# Patient Record
Sex: MALE | Race: WHITE | Employment: OTHER | URBAN - METROPOLITAN AREA
[De-identification: names, ages, dates, MRNs, and addresses within clinical notes are randomized per-mention and may not be internally consistent; named-entity substitution may affect disease eponyms.]

---

## 2017-03-03 PROBLEM — E66.01 SEVERE OBESITY (BMI 35.0-39.9) WITH COMORBIDITY (HCC): Status: ACTIVE | Noted: 2017-03-03

## 2017-03-03 PROBLEM — I77.9 BILATERAL CAROTID ARTERY DISEASE (HCC): Status: ACTIVE | Noted: 2017-03-03

## 2017-03-03 PROBLEM — I70.0 AORTIC ATHEROSCLEROSIS (HCC): Status: ACTIVE | Noted: 2017-03-03

## 2017-03-06 PROCEDURE — 86803 HEPATITIS C AB TEST: CPT | Performed by: INTERNAL MEDICINE

## 2017-03-07 PROBLEM — R73.01 IFG (IMPAIRED FASTING GLUCOSE): Status: ACTIVE | Noted: 2017-03-07

## 2017-04-04 PROBLEM — G47.33 OSA (OBSTRUCTIVE SLEEP APNEA): Status: ACTIVE | Noted: 2017-04-04

## 2018-03-26 PROBLEM — R91.1 INCIDENTAL PULMONARY NODULE, > 3MM AND < 8MM: Status: ACTIVE | Noted: 2018-03-26

## 2018-05-01 PROBLEM — I25.10 CORONARY ARTERY CALCIFICATION: Status: ACTIVE | Noted: 2018-05-01

## 2018-05-01 PROBLEM — R07.2 PRECORDIAL CHEST PAIN: Status: ACTIVE | Noted: 2018-05-01

## 2018-05-01 PROBLEM — I25.84 CORONARY ARTERY CALCIFICATION: Status: ACTIVE | Noted: 2018-05-01

## 2018-06-06 PROBLEM — R94.39 ABNORMAL NUCLEAR STRESS TEST: Status: ACTIVE | Noted: 2018-06-06

## 2018-06-06 PROBLEM — I25.10 CORONARY ARTERY DISEASE INVOLVING NATIVE CORONARY ARTERY OF NATIVE HEART WITHOUT ANGINA PECTORIS: Status: ACTIVE | Noted: 2018-06-06

## 2018-06-13 ENCOUNTER — HOSPITAL ENCOUNTER (OUTPATIENT)
Dept: INTERVENTIONAL RADIOLOGY/VASCULAR | Facility: HOSPITAL | Age: 73
Discharge: HOME | End: 2018-06-14
Attending: INTERNAL MEDICINE | Admitting: INTERNAL MEDICINE
Payer: MEDICARE

## 2018-06-13 DIAGNOSIS — R93.1 ABNORMAL NUCLEAR CARDIAC IMAGING TEST: ICD-10-CM

## 2018-06-13 DIAGNOSIS — I25.10 CAD (CORONARY ARTERY DISEASE): ICD-10-CM

## 2018-06-13 DIAGNOSIS — R07.9 CHEST PAIN, UNSPECIFIED TYPE: ICD-10-CM

## 2018-06-13 PROCEDURE — 85027 COMPLETE CBC AUTOMATED: CPT | Performed by: INTERNAL MEDICINE

## 2018-06-13 PROCEDURE — 027034Z DILATION OF CORONARY ARTERY, ONE ARTERY WITH DRUG-ELUTING INTRALUMINAL DEVICE, PERCUTANEOUS APPROACH: ICD-10-PCS | Performed by: INTERNAL MEDICINE

## 2018-06-13 PROCEDURE — 80076 HEPATIC FUNCTION PANEL: CPT

## 2018-06-13 PROCEDURE — 99152 MOD SED SAME PHYS/QHP 5/>YRS: CPT

## 2018-06-13 PROCEDURE — 93454 CORONARY ARTERY ANGIO S&I: CPT

## 2018-06-13 PROCEDURE — 93458 L HRT ARTERY/VENTRICLE ANGIO: CPT

## 2018-06-13 PROCEDURE — 4A023N7 MEASUREMENT OF CARDIAC SAMPLING AND PRESSURE, LEFT HEART, PERCUTANEOUS APPROACH: ICD-10-PCS | Performed by: INTERNAL MEDICINE

## 2018-06-13 PROCEDURE — 94660 CPAP INITIATION&MGMT: CPT

## 2018-06-13 PROCEDURE — 99153 MOD SED SAME PHYS/QHP EA: CPT

## 2018-06-13 PROCEDURE — 36415 COLL VENOUS BLD VENIPUNCTURE: CPT

## 2018-06-13 PROCEDURE — 85347 COAGULATION TIME ACTIVATED: CPT

## 2018-06-13 PROCEDURE — 80048 BASIC METABOLIC PNL TOTAL CA: CPT | Performed by: INTERNAL MEDICINE

## 2018-06-13 PROCEDURE — B2111ZZ FLUOROSCOPY OF MULTIPLE CORONARY ARTERIES USING LOW OSMOLAR CONTRAST: ICD-10-PCS | Performed by: INTERNAL MEDICINE

## 2018-06-13 PROCEDURE — 80061 LIPID PANEL: CPT

## 2018-06-13 RX ORDER — MIDAZOLAM HYDROCHLORIDE 1 MG/ML
INJECTION INTRAMUSCULAR; INTRAVENOUS
Status: COMPLETED
Start: 2018-06-13 | End: 2018-06-13

## 2018-06-13 RX ORDER — SODIUM CHLORIDE 9 MG/ML
INJECTION, SOLUTION INTRAVENOUS CONTINUOUS
Status: DISCONTINUED | OUTPATIENT
Start: 2018-06-13 | End: 2018-06-14

## 2018-06-13 RX ORDER — LIDOCAINE HYDROCHLORIDE 10 MG/ML
INJECTION, SOLUTION EPIDURAL; INFILTRATION; INTRACAUDAL; PERINEURAL
Status: DISCONTINUED
Start: 2018-06-13 | End: 2018-06-13 | Stop reason: WASHOUT

## 2018-06-13 RX ORDER — HEPARIN SODIUM 10000 [USP'U]/100ML
7 INJECTION, SOLUTION INTRAVENOUS CONTINUOUS
Status: ACTIVE | OUTPATIENT
Start: 2018-06-13 | End: 2018-06-13

## 2018-06-13 RX ORDER — ENALAPRIL MALEATE 10 MG/1
20 TABLET ORAL DAILY
Status: DISCONTINUED | OUTPATIENT
Start: 2018-06-13 | End: 2018-06-14

## 2018-06-13 RX ORDER — PRASUGREL 10 MG/1
TABLET, FILM COATED ORAL
Status: COMPLETED
Start: 2018-06-13 | End: 2018-06-13

## 2018-06-13 RX ORDER — VERAPAMIL HYDROCHLORIDE 2.5 MG/ML
INJECTION, SOLUTION INTRAVENOUS
Status: COMPLETED
Start: 2018-06-13 | End: 2018-06-13

## 2018-06-13 RX ORDER — ASPIRIN 81 MG/1
324 TABLET, CHEWABLE ORAL ONCE
Status: DISCONTINUED | OUTPATIENT
Start: 2018-06-13 | End: 2018-06-14

## 2018-06-13 RX ORDER — AMLODIPINE BESYLATE 5 MG/1
5 TABLET ORAL DAILY
Status: DISCONTINUED | OUTPATIENT
Start: 2018-06-13 | End: 2018-06-14

## 2018-06-13 RX ORDER — HEPARIN SODIUM 10000 [USP'U]/100ML
INJECTION, SOLUTION INTRAVENOUS
Status: COMPLETED
Start: 2018-06-13 | End: 2018-06-13

## 2018-06-13 RX ORDER — HEPARIN SODIUM 5000 [USP'U]/ML
INJECTION, SOLUTION INTRAVENOUS; SUBCUTANEOUS
Status: COMPLETED
Start: 2018-06-13 | End: 2018-06-13

## 2018-06-13 RX ORDER — ASPIRIN 81 MG/1
81 TABLET ORAL DAILY
Status: DISCONTINUED | OUTPATIENT
Start: 2018-06-14 | End: 2018-06-14

## 2018-06-13 RX ORDER — ATORVASTATIN CALCIUM 40 MG/1
40 TABLET, FILM COATED ORAL NIGHTLY
Status: DISCONTINUED | OUTPATIENT
Start: 2018-06-13 | End: 2018-06-14

## 2018-06-13 RX ORDER — HYDROCHLOROTHIAZIDE 25 MG/1
25 TABLET ORAL DAILY
Status: DISCONTINUED | OUTPATIENT
Start: 2018-06-13 | End: 2018-06-14

## 2018-06-13 RX ORDER — PRASUGREL 10 MG/1
10 TABLET, FILM COATED ORAL DAILY
Status: DISCONTINUED | OUTPATIENT
Start: 2018-06-14 | End: 2018-06-14

## 2018-06-13 RX ORDER — SODIUM CHLORIDE 9 MG/ML
INJECTION, SOLUTION INTRAVENOUS CONTINUOUS
Status: ACTIVE | OUTPATIENT
Start: 2018-06-13 | End: 2018-06-14

## 2018-06-13 RX ADMIN — ATORVASTATIN CALCIUM 40 MG: 40 TABLET, FILM COATED ORAL at 21:39:00

## 2018-06-13 RX ADMIN — SODIUM CHLORIDE: 9 INJECTION, SOLUTION INTRAVENOUS at 20:15:00

## 2018-06-13 RX ADMIN — SODIUM CHLORIDE: 9 INJECTION, SOLUTION INTRAVENOUS at 14:30:00

## 2018-06-13 RX ADMIN — AMLODIPINE BESYLATE 5 MG: 5 TABLET ORAL at 21:39:00

## 2018-06-13 RX ADMIN — HEPARIN SODIUM 11.3 UNITS/KG/HR: 10000 INJECTION, SOLUTION INTRAVENOUS at 20:15:00

## 2018-06-13 RX ADMIN — ENALAPRIL MALEATE 20 MG: 10 TABLET ORAL at 21:39:00

## 2018-06-13 NOTE — PROGRESS NOTES
Cath Note  Patient with risk factors for CAD, positive coronary calcium score, abnormal nuclear stress test with anterior apical ischemia. Plan cath possible PTCA. Risks benefits and alternatives shared with patient who understands and wishes to proceed.

## 2018-06-14 VITALS
TEMPERATURE: 98 F | BODY MASS INDEX: 30.61 KG/M2 | OXYGEN SATURATION: 100 % | HEART RATE: 51 BPM | RESPIRATION RATE: 17 BRPM | SYSTOLIC BLOOD PRESSURE: 125 MMHG | DIASTOLIC BLOOD PRESSURE: 71 MMHG | WEIGHT: 195 LBS | HEIGHT: 67 IN

## 2018-06-14 PROBLEM — G47.33 OSA ON CPAP: Status: ACTIVE | Noted: 2017-04-04

## 2018-06-14 PROBLEM — Z95.820 S/P ANGIOPLASTY WITH STENT: Status: ACTIVE | Noted: 2018-06-14

## 2018-06-14 PROBLEM — Z99.89 OSA ON CPAP: Status: ACTIVE | Noted: 2017-04-04

## 2018-06-14 PROCEDURE — 80048 BASIC METABOLIC PNL TOTAL CA: CPT | Performed by: INTERNAL MEDICINE

## 2018-06-14 PROCEDURE — 85027 COMPLETE CBC AUTOMATED: CPT | Performed by: INTERNAL MEDICINE

## 2018-06-14 PROCEDURE — 99211 OFF/OP EST MAY X REQ PHY/QHP: CPT

## 2018-06-14 RX ORDER — PRASUGREL 10 MG/1
10 TABLET, FILM COATED ORAL DAILY
Qty: 90 TABLET | Refills: 2 | Status: SHIPPED | OUTPATIENT
Start: 2018-06-15 | End: 2019-02-11

## 2018-06-14 RX ORDER — ASPIRIN 81 MG/1
81 TABLET ORAL DAILY
Qty: 90 TABLET | Refills: 0 | Status: SHIPPED | OUTPATIENT
Start: 2018-06-15

## 2018-06-14 RX ADMIN — HYDROCHLOROTHIAZIDE 25 MG: 25 TABLET ORAL at 08:28:00

## 2018-06-14 RX ADMIN — PRASUGREL 10 MG: 10 TABLET, FILM COATED ORAL at 08:28:00

## 2018-06-14 RX ADMIN — ENALAPRIL MALEATE 20 MG: 10 TABLET ORAL at 08:28:00

## 2018-06-14 RX ADMIN — ASPIRIN 81 MG: 81 TABLET ORAL at 08:28:00

## 2018-06-14 RX ADMIN — AMLODIPINE BESYLATE 5 MG: 5 TABLET ORAL at 08:28:00

## 2018-06-14 NOTE — PROGRESS NOTES
[facility] Cardiology Progress Note - Jeronimo Hilton Patient Status:  Outpatient in a Bed    1945 MRN [id number]   [facility] 8NE-A Attending Jacob Eason MD   Hosp Day # 0 PCP Sabrina Buckley MD     Subjective: no bruits. Cardiac: Regular rate and rhythm. S1, S2 normal. No murmur, pericardial rub, S3, thrill, heave or extra cardiac sounds. Lungs: Clear without wheezes, rales, rhonchi or dullness. Normal excursions and effort. Abdomen: Soft, non-tender.  No org extremities  Neuro: no sensory or motor complaint  Psyche: no symptoms of depression or anxiety  Hematology: denies bruising or excessive bleeding  Endocrine: no history of diabetes  Allergy: see above drug allergies    Albert Alvarenga MD  6/14/2018  11:24

## 2018-06-14 NOTE — PLAN OF CARE
Patient/Family Goals    • Patient/Family Long Term Goal Progressing    • Patient/Family Short Term Goal Progressing          HX: PRIYA, HTN, CAD,    Patient came in for scheduled cath after abnormal stress, EF 60-65%, RT radial- KARLOS to RCA- TR band- hematoma

## 2018-06-14 NOTE — RESPIRATORY THERAPY NOTE
PRIYA : EQUIPMENT USE: DAILY SUMMARY                                            SET MODE: CPAP WITH CFLEX                                          USAGE IN HOURS: 6:23                                          90% EXP

## 2018-06-14 NOTE — PROGRESS NOTES
Post Procedure Note  Heart catheterization without LV, bilateral sector coronary angiogram.  Prelim left coronary normal.  Right coronary 90% narrowing. PTCA and drug-eluting stent RCA.   Patient was on aspirin received Effient and heparin to an ACT greate

## 2018-06-14 NOTE — PROCEDURES
Fady [address] [facility] Patient Status:  Outpatient in a Bed    1945 MRN [id number]   Location [address] [facility] Attending Hesham Espino MD   Hosp Day # 0 PCP Matthew Andrew MD                                       Car was removed over a J-tipped guidewire. A TR band was positioned on the right wrist and inflated with 13 cc of air. The radial sheath was removed and good hemostasis was obtained.  A good pulse oximeter waveform was recorded from the right hand with the angiography:  -RCA–RCA was a large dominant vessel. There is a 90% mid vessel narrowing. The distal vessel free significant narrowings     Conclusions:   -RCA–90% mid RCA narrowing.       Jeronimo Hayes MD [facility]

## 2018-06-14 NOTE — PROCEDURES
PTCA and stenting of RCA. Using the previously placed 6 [place]ese right radial access sheath, a JR4 Joy coronary guide catheter was used to cannulate the the right coronary artery and roadmapping coronary angiogram was performed.   Patient was on aspirin

## 2018-06-14 NOTE — DIETARY NOTE
Nutrition Short Note    Dietitian consult received per cardiac rehab/CHF protocol. Pt to be educated by cardiac rehab staff and encouraged to attend outpatient classes taught by RD. MAYO available PRN.     Kev Balderas MA, RD, LDN

## 2018-07-18 PROBLEM — E66.01 SEVERE OBESITY (BMI 35.0-39.9) WITH COMORBIDITY (HCC): Status: RESOLVED | Noted: 2017-03-03 | Resolved: 2018-07-18

## 2018-07-19 ENCOUNTER — CARDPULM VISIT (OUTPATIENT)
Dept: CARDIAC REHAB | Facility: HOSPITAL | Age: 73
End: 2018-07-19
Attending: INTERNAL MEDICINE
Payer: MEDICARE

## 2018-07-19 VITALS
HEART RATE: 51 BPM | RESPIRATION RATE: 20 BRPM | DIASTOLIC BLOOD PRESSURE: 80 MMHG | WEIGHT: 190.38 LBS | SYSTOLIC BLOOD PRESSURE: 134 MMHG | OXYGEN SATURATION: 98 % | BODY MASS INDEX: 29.88 KG/M2 | HEIGHT: 67 IN

## 2018-07-19 PROCEDURE — 93798 PHYS/QHP OP CAR RHAB W/ECG: CPT

## 2018-07-23 ENCOUNTER — CARDPULM VISIT (OUTPATIENT)
Dept: CARDIAC REHAB | Facility: HOSPITAL | Age: 73
End: 2018-07-23
Attending: INTERNAL MEDICINE
Payer: MEDICARE

## 2018-07-23 PROCEDURE — 93798 PHYS/QHP OP CAR RHAB W/ECG: CPT

## 2018-07-25 ENCOUNTER — CARDPULM VISIT (OUTPATIENT)
Dept: CARDIAC REHAB | Facility: HOSPITAL | Age: 73
End: 2018-07-25
Attending: INTERNAL MEDICINE
Payer: MEDICARE

## 2018-07-25 PROCEDURE — 93798 PHYS/QHP OP CAR RHAB W/ECG: CPT

## 2018-07-27 ENCOUNTER — CARDPULM VISIT (OUTPATIENT)
Dept: CARDIAC REHAB | Facility: HOSPITAL | Age: 73
End: 2018-07-27
Attending: INTERNAL MEDICINE
Payer: MEDICARE

## 2018-07-27 PROCEDURE — 93798 PHYS/QHP OP CAR RHAB W/ECG: CPT

## 2018-07-30 ENCOUNTER — CARDPULM VISIT (OUTPATIENT)
Dept: CARDIAC REHAB | Facility: HOSPITAL | Age: 73
End: 2018-07-30
Attending: INTERNAL MEDICINE
Payer: MEDICARE

## 2018-07-30 PROCEDURE — 93798 PHYS/QHP OP CAR RHAB W/ECG: CPT

## 2018-08-01 ENCOUNTER — CARDPULM VISIT (OUTPATIENT)
Dept: CARDIAC REHAB | Facility: HOSPITAL | Age: 73
End: 2018-08-01
Attending: INTERNAL MEDICINE
Payer: MEDICARE

## 2018-08-01 PROCEDURE — 93798 PHYS/QHP OP CAR RHAB W/ECG: CPT

## 2018-08-03 ENCOUNTER — CARDPULM VISIT (OUTPATIENT)
Dept: CARDIAC REHAB | Facility: HOSPITAL | Age: 73
End: 2018-08-03
Attending: INTERNAL MEDICINE
Payer: MEDICARE

## 2018-08-03 PROCEDURE — 93798 PHYS/QHP OP CAR RHAB W/ECG: CPT

## 2018-08-06 ENCOUNTER — CARDPULM VISIT (OUTPATIENT)
Dept: CARDIAC REHAB | Facility: HOSPITAL | Age: 73
End: 2018-08-06
Attending: INTERNAL MEDICINE
Payer: MEDICARE

## 2018-08-06 PROCEDURE — 93798 PHYS/QHP OP CAR RHAB W/ECG: CPT

## 2018-08-08 ENCOUNTER — CARDPULM VISIT (OUTPATIENT)
Dept: CARDIAC REHAB | Facility: HOSPITAL | Age: 73
End: 2018-08-08
Attending: INTERNAL MEDICINE
Payer: MEDICARE

## 2018-08-08 PROCEDURE — 93798 PHYS/QHP OP CAR RHAB W/ECG: CPT

## 2018-08-10 ENCOUNTER — CARDPULM VISIT (OUTPATIENT)
Dept: CARDIAC REHAB | Facility: HOSPITAL | Age: 73
End: 2018-08-10
Attending: INTERNAL MEDICINE
Payer: MEDICARE

## 2018-08-10 PROCEDURE — 93798 PHYS/QHP OP CAR RHAB W/ECG: CPT

## 2018-08-13 ENCOUNTER — CARDPULM VISIT (OUTPATIENT)
Dept: CARDIAC REHAB | Facility: HOSPITAL | Age: 73
End: 2018-08-13
Attending: INTERNAL MEDICINE
Payer: MEDICARE

## 2018-08-13 PROCEDURE — 93798 PHYS/QHP OP CAR RHAB W/ECG: CPT

## 2018-08-15 ENCOUNTER — CARDPULM VISIT (OUTPATIENT)
Dept: CARDIAC REHAB | Facility: HOSPITAL | Age: 73
End: 2018-08-15
Attending: INTERNAL MEDICINE
Payer: MEDICARE

## 2018-08-15 PROCEDURE — 93798 PHYS/QHP OP CAR RHAB W/ECG: CPT

## 2018-08-17 ENCOUNTER — CARDPULM VISIT (OUTPATIENT)
Dept: CARDIAC REHAB | Facility: HOSPITAL | Age: 73
End: 2018-08-17
Attending: INTERNAL MEDICINE
Payer: MEDICARE

## 2018-08-17 PROCEDURE — 93798 PHYS/QHP OP CAR RHAB W/ECG: CPT

## 2018-08-20 ENCOUNTER — CARDPULM VISIT (OUTPATIENT)
Dept: CARDIAC REHAB | Facility: HOSPITAL | Age: 73
End: 2018-08-20
Attending: INTERNAL MEDICINE
Payer: MEDICARE

## 2018-08-20 PROCEDURE — 93798 PHYS/QHP OP CAR RHAB W/ECG: CPT

## 2018-08-22 ENCOUNTER — CARDPULM VISIT (OUTPATIENT)
Dept: CARDIAC REHAB | Facility: HOSPITAL | Age: 73
End: 2018-08-22
Attending: INTERNAL MEDICINE
Payer: MEDICARE

## 2018-08-22 PROCEDURE — 93798 PHYS/QHP OP CAR RHAB W/ECG: CPT

## 2018-08-24 ENCOUNTER — CARDPULM VISIT (OUTPATIENT)
Dept: CARDIAC REHAB | Facility: HOSPITAL | Age: 73
End: 2018-08-24
Attending: INTERNAL MEDICINE
Payer: MEDICARE

## 2018-08-24 PROCEDURE — 93798 PHYS/QHP OP CAR RHAB W/ECG: CPT

## 2018-08-27 ENCOUNTER — CARDPULM VISIT (OUTPATIENT)
Dept: CARDIAC REHAB | Facility: HOSPITAL | Age: 73
End: 2018-08-27
Attending: INTERNAL MEDICINE
Payer: MEDICARE

## 2018-08-27 PROCEDURE — 93798 PHYS/QHP OP CAR RHAB W/ECG: CPT

## 2018-08-29 ENCOUNTER — CARDPULM VISIT (OUTPATIENT)
Dept: CARDIAC REHAB | Facility: HOSPITAL | Age: 73
End: 2018-08-29
Attending: INTERNAL MEDICINE
Payer: MEDICARE

## 2018-08-29 PROCEDURE — 93798 PHYS/QHP OP CAR RHAB W/ECG: CPT

## 2018-08-31 ENCOUNTER — CARDPULM VISIT (OUTPATIENT)
Dept: CARDIAC REHAB | Facility: HOSPITAL | Age: 73
End: 2018-08-31
Attending: INTERNAL MEDICINE
Payer: MEDICARE

## 2018-08-31 PROCEDURE — 93798 PHYS/QHP OP CAR RHAB W/ECG: CPT

## 2018-09-05 ENCOUNTER — CARDPULM VISIT (OUTPATIENT)
Dept: CARDIAC REHAB | Facility: HOSPITAL | Age: 73
End: 2018-09-05
Attending: INTERNAL MEDICINE
Payer: MEDICARE

## 2018-09-05 PROBLEM — R94.39 ABNORMAL NUCLEAR STRESS TEST: Status: RESOLVED | Noted: 2018-06-06 | Resolved: 2018-09-05

## 2018-09-05 PROBLEM — I25.10 CORONARY ARTERY DISEASE INVOLVING NATIVE CORONARY ARTERY OF NATIVE HEART WITHOUT ANGINA PECTORIS: Chronic | Status: ACTIVE | Noted: 2018-06-06

## 2018-09-05 PROBLEM — I25.10 CORONARY ARTERY CALCIFICATION: Chronic | Status: ACTIVE | Noted: 2018-05-01

## 2018-09-05 PROBLEM — I25.84 CORONARY ARTERY CALCIFICATION: Chronic | Status: RESOLVED | Noted: 2018-05-01 | Resolved: 2018-09-05

## 2018-09-05 PROBLEM — Z95.820 S/P ANGIOPLASTY WITH STENT: Chronic | Status: ACTIVE | Noted: 2018-06-14

## 2018-09-05 PROBLEM — R73.01 IFG (IMPAIRED FASTING GLUCOSE): Chronic | Status: ACTIVE | Noted: 2017-03-07

## 2018-09-05 PROBLEM — I25.10 CORONARY ARTERY CALCIFICATION: Chronic | Status: RESOLVED | Noted: 2018-05-01 | Resolved: 2018-09-05

## 2018-09-05 PROBLEM — R07.2 PRECORDIAL CHEST PAIN: Status: RESOLVED | Noted: 2018-05-01 | Resolved: 2018-09-05

## 2018-09-05 PROBLEM — I25.84 CORONARY ARTERY CALCIFICATION: Chronic | Status: ACTIVE | Noted: 2018-05-01

## 2018-09-05 PROBLEM — R91.1 INCIDENTAL PULMONARY NODULE, > 3MM AND < 8MM: Chronic | Status: ACTIVE | Noted: 2018-03-26

## 2018-09-05 PROCEDURE — 93798 PHYS/QHP OP CAR RHAB W/ECG: CPT

## 2018-09-10 ENCOUNTER — CARDPULM VISIT (OUTPATIENT)
Dept: CARDIAC REHAB | Facility: HOSPITAL | Age: 73
End: 2018-09-10
Attending: INTERNAL MEDICINE
Payer: MEDICARE

## 2018-09-10 PROCEDURE — 93798 PHYS/QHP OP CAR RHAB W/ECG: CPT

## 2018-09-12 ENCOUNTER — CARDPULM VISIT (OUTPATIENT)
Dept: CARDIAC REHAB | Facility: HOSPITAL | Age: 73
End: 2018-09-12
Attending: INTERNAL MEDICINE
Payer: MEDICARE

## 2018-09-12 PROCEDURE — 93798 PHYS/QHP OP CAR RHAB W/ECG: CPT

## 2018-09-14 ENCOUNTER — CARDPULM VISIT (OUTPATIENT)
Dept: CARDIAC REHAB | Facility: HOSPITAL | Age: 73
End: 2018-09-14
Attending: INTERNAL MEDICINE
Payer: MEDICARE

## 2018-09-14 PROCEDURE — 93798 PHYS/QHP OP CAR RHAB W/ECG: CPT

## 2018-09-17 ENCOUNTER — CARDPULM VISIT (OUTPATIENT)
Dept: CARDIAC REHAB | Facility: HOSPITAL | Age: 73
End: 2018-09-17
Attending: INTERNAL MEDICINE
Payer: MEDICARE

## 2018-09-17 PROCEDURE — 93798 PHYS/QHP OP CAR RHAB W/ECG: CPT

## 2018-09-19 ENCOUNTER — CARDPULM VISIT (OUTPATIENT)
Dept: CARDIAC REHAB | Facility: HOSPITAL | Age: 73
End: 2018-09-19
Attending: INTERNAL MEDICINE
Payer: MEDICARE

## 2018-09-19 PROCEDURE — 93798 PHYS/QHP OP CAR RHAB W/ECG: CPT

## 2018-09-21 ENCOUNTER — CARDPULM VISIT (OUTPATIENT)
Dept: CARDIAC REHAB | Facility: HOSPITAL | Age: 73
End: 2018-09-21
Attending: INTERNAL MEDICINE
Payer: MEDICARE

## 2018-09-21 PROCEDURE — 93798 PHYS/QHP OP CAR RHAB W/ECG: CPT

## 2018-09-24 ENCOUNTER — CARDPULM VISIT (OUTPATIENT)
Dept: CARDIAC REHAB | Facility: HOSPITAL | Age: 73
End: 2018-09-24
Attending: INTERNAL MEDICINE
Payer: MEDICARE

## 2018-09-24 PROCEDURE — 93798 PHYS/QHP OP CAR RHAB W/ECG: CPT

## 2018-09-26 ENCOUNTER — CARDPULM VISIT (OUTPATIENT)
Dept: CARDIAC REHAB | Facility: HOSPITAL | Age: 73
End: 2018-09-26
Attending: INTERNAL MEDICINE
Payer: MEDICARE

## 2018-09-26 PROCEDURE — 93798 PHYS/QHP OP CAR RHAB W/ECG: CPT

## 2018-09-28 ENCOUNTER — CARDPULM VISIT (OUTPATIENT)
Dept: CARDIAC REHAB | Facility: HOSPITAL | Age: 73
End: 2018-09-28
Attending: INTERNAL MEDICINE
Payer: MEDICARE

## 2018-09-28 PROCEDURE — 93798 PHYS/QHP OP CAR RHAB W/ECG: CPT

## 2018-10-01 ENCOUNTER — CARDPULM VISIT (OUTPATIENT)
Dept: CARDIAC REHAB | Facility: HOSPITAL | Age: 73
End: 2018-10-01
Attending: INTERNAL MEDICINE
Payer: MEDICARE

## 2018-10-03 ENCOUNTER — CARDPULM VISIT (OUTPATIENT)
Dept: CARDIAC REHAB | Facility: HOSPITAL | Age: 73
End: 2018-10-03
Attending: INTERNAL MEDICINE
Payer: MEDICARE

## 2018-10-03 PROCEDURE — 93798 PHYS/QHP OP CAR RHAB W/ECG: CPT

## 2018-10-05 ENCOUNTER — CARDPULM VISIT (OUTPATIENT)
Dept: CARDIAC REHAB | Facility: HOSPITAL | Age: 73
End: 2018-10-05
Attending: INTERNAL MEDICINE
Payer: MEDICARE

## 2018-10-05 PROCEDURE — 93798 PHYS/QHP OP CAR RHAB W/ECG: CPT

## 2018-10-08 ENCOUNTER — CARDPULM VISIT (OUTPATIENT)
Dept: CARDIAC REHAB | Facility: HOSPITAL | Age: 73
End: 2018-10-08
Attending: INTERNAL MEDICINE
Payer: MEDICARE

## 2018-10-08 PROCEDURE — 93798 PHYS/QHP OP CAR RHAB W/ECG: CPT

## 2018-10-10 ENCOUNTER — CARDPULM VISIT (OUTPATIENT)
Dept: CARDIAC REHAB | Facility: HOSPITAL | Age: 73
End: 2018-10-10
Attending: INTERNAL MEDICINE
Payer: MEDICARE

## 2018-10-10 PROCEDURE — 93798 PHYS/QHP OP CAR RHAB W/ECG: CPT

## 2018-10-12 ENCOUNTER — CARDPULM VISIT (OUTPATIENT)
Dept: CARDIAC REHAB | Facility: HOSPITAL | Age: 73
End: 2018-10-12
Attending: INTERNAL MEDICINE
Payer: MEDICARE

## 2018-10-12 PROCEDURE — 93798 PHYS/QHP OP CAR RHAB W/ECG: CPT

## 2018-10-26 ENCOUNTER — HOSPITAL ENCOUNTER (EMERGENCY)
Facility: HOSPITAL | Age: 73
Discharge: HOME | End: 2018-10-26
Attending: EMERGENCY MEDICINE
Payer: MEDICARE

## 2018-10-26 VITALS
OXYGEN SATURATION: 97 % | BODY MASS INDEX: 29.03 KG/M2 | HEIGHT: 67 IN | HEART RATE: 64 BPM | RESPIRATION RATE: 16 BRPM | WEIGHT: 185 LBS | SYSTOLIC BLOOD PRESSURE: 135 MMHG | TEMPERATURE: 98 F | DIASTOLIC BLOOD PRESSURE: 69 MMHG

## 2018-10-26 DIAGNOSIS — S41.152A DOG BITE OF LEFT UPPER EXTREMITY, INITIAL ENCOUNTER: Primary | ICD-10-CM
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
## 2018-10-26 PROCEDURE — 90471 IMMUNIZATION ADMIN: CPT

## 2018-10-26 PROCEDURE — 99283 EMERGENCY DEPT VISIT LOW MDM: CPT

## 2018-10-26 PROCEDURE — 99284 EMERGENCY DEPT VISIT MOD MDM: CPT

## 2018-10-26 RX ORDER — AMOXICILLIN AND CLAVULANATE POTASSIUM 875; 125 MG/1; MG/1
1 TABLET, FILM COATED ORAL 2 TIMES DAILY
Qty: 14 TABLET | Refills: 0 | Status: SHIPPED | OUTPATIENT
Start: 2018-10-26 | End: 2018-11-02

## 2018-10-26 NOTE — ED INITIAL ASSESSMENT (HPI)
Pt to ED for dog bite 45 minutes prior to arrival. Pt is on blood thinners. Bite noted to left wrist, circumferential. Swelling and bruising present.

## 2018-10-27 NOTE — ED PROVIDER NOTES
Patient Seen in: [facility] Emergency Department    History   Patient presents with:  Bite (integumentary)    Stated Complaint: dog bite to the left forearm    HPI    CHIEF COMPLAINT: Dog bite left forearm    HISTORY OF PRESENT ILLNESS: Patient is a Procedure: COLONOSCOPY, POSSIBLE BIOPSY, POSSIBLE POLYPECTOMY 30268;  Surgeon: Anabel Macdonald MD;  Location: [address] [place] [address]   • COLONOSCOPY  6/21/13  [facility])    Screening: sigmoid diverticulosis, int hemorrhoids; next in 10 yrs   • COLONO Current:/64   Pulse 70   Temp 98.1 °F (36.7 °C) (Temporal)   Resp 16   Ht 170.2 cm (5' 7\")   Wt 83.9 kg   SpO2 97%   BMI 28.98 kg/m²         Physical Exam   Constitutional: He is oriented to person, place, and time.  He appears well-developed and wel Patient was screened and evaluated during this visit. I determined, within reasonable clinical confidence and prior to discharge, that an emergency medical condition was not or was no longer present.   There was no indication for further evaluation, treat

## 2018-10-27 NOTE — ED NOTES
I reviewed that chart and discussed the case. I have examined the patient and noted the patient states that his dog got into altercation with another dog. And subsequently when he tried to break it up he is dog bit him in the left arm forearm.   He has mu

## 2019-03-14 PROBLEM — Z11.59 NEED FOR HEPATITIS C SCREENING TEST: Status: ACTIVE | Noted: 2019-03-14

## 2019-04-05 PROBLEM — I77.9 BILATERAL CAROTID ARTERY DISEASE (HCC): Chronic | Status: ACTIVE | Noted: 2017-03-03

## 2019-04-05 PROBLEM — Z11.59 NEED FOR HEPATITIS C SCREENING TEST: Chronic | Status: ACTIVE | Noted: 2019-03-14

## 2019-04-05 PROBLEM — I70.0 AORTIC ATHEROSCLEROSIS (HCC): Chronic | Status: ACTIVE | Noted: 2017-03-03

## 2019-04-05 PROBLEM — Z99.89 OSA ON CPAP: Chronic | Status: ACTIVE | Noted: 2017-04-04

## 2019-04-05 PROBLEM — G47.33 OSA ON CPAP: Chronic | Status: ACTIVE | Noted: 2017-04-04

## 2020-05-12 PROBLEM — R91.1 INCIDENTAL PULMONARY NODULE, > 3MM AND < 8MM: Chronic | Status: RESOLVED | Noted: 2018-03-26 | Resolved: 2020-05-12

## 2022-03-11 ENCOUNTER — LAB ENCOUNTER (OUTPATIENT)
Dept: LAB | Facility: HOSPITAL | Age: 77
End: 2022-03-11
Attending: INTERNAL MEDICINE
Payer: MEDICARE

## 2022-03-11 DIAGNOSIS — K80.20 GALLSTONES: ICD-10-CM

## 2022-03-12 LAB — SARS-COV-2 RNA RESP QL NAA+PROBE: NOT DETECTED

## 2022-03-14 ENCOUNTER — ANESTHESIA EVENT (OUTPATIENT)
Dept: ENDOSCOPY | Facility: HOSPITAL | Age: 77
End: 2022-03-14
Payer: MEDICARE

## 2022-03-14 ENCOUNTER — ANESTHESIA (OUTPATIENT)
Dept: ENDOSCOPY | Facility: HOSPITAL | Age: 77
End: 2022-03-14
Payer: MEDICARE

## 2022-03-14 ENCOUNTER — HOSPITAL ENCOUNTER (OUTPATIENT)
Facility: HOSPITAL | Age: 77
Setting detail: HOSPITAL OUTPATIENT SURGERY
Discharge: HOME | End: 2022-03-14
Attending: INTERNAL MEDICINE | Admitting: INTERNAL MEDICINE
Payer: MEDICARE

## 2022-03-14 VITALS
TEMPERATURE: 98 F | BODY MASS INDEX: 31.39 KG/M2 | WEIGHT: 200 LBS | HEIGHT: 67 IN | SYSTOLIC BLOOD PRESSURE: 161 MMHG | RESPIRATION RATE: 16 BRPM | HEART RATE: 52 BPM | OXYGEN SATURATION: 100 % | DIASTOLIC BLOOD PRESSURE: 71 MMHG

## 2022-03-14 DIAGNOSIS — K80.20 GALLSTONES: Primary | ICD-10-CM

## 2022-03-14 DIAGNOSIS — R93.2 BILIARY TRACT IMAGING ABNORMALITY: ICD-10-CM

## 2022-03-14 DIAGNOSIS — Z20.822 ENCOUNTER FOR PREPROCEDURE SCREENING LABORATORY TESTING FOR COVID-19: ICD-10-CM

## 2022-03-14 DIAGNOSIS — Z01.812 ENCOUNTER FOR PREPROCEDURE SCREENING LABORATORY TESTING FOR COVID-19: ICD-10-CM

## 2022-03-14 DIAGNOSIS — R74.8 ELEVATED LIVER ENZYMES: ICD-10-CM

## 2022-03-14 PROCEDURE — 0DJ08ZZ INSPECTION OF UPPER INTESTINAL TRACT, VIA NATURAL OR ARTIFICIAL OPENING ENDOSCOPIC: ICD-10-PCS | Performed by: INTERNAL MEDICINE

## 2022-03-14 PROCEDURE — BD47ZZZ ULTRASONOGRAPHY OF GASTROINTESTINAL TRACT: ICD-10-PCS | Performed by: INTERNAL MEDICINE

## 2022-03-14 RX ORDER — LIDOCAINE HYDROCHLORIDE 10 MG/ML
INJECTION, SOLUTION EPIDURAL; INFILTRATION; INTRACAUDAL; PERINEURAL AS NEEDED
Status: DISCONTINUED | OUTPATIENT
Start: 2022-03-14 | End: 2022-03-14 | Stop reason: SURG

## 2022-03-14 RX ORDER — HYDROCODONE BITARTRATE AND ACETAMINOPHEN 10; 325 MG/1; MG/1
2 TABLET ORAL AS NEEDED
OUTPATIENT
Start: 2022-03-14

## 2022-03-14 RX ORDER — METOCLOPRAMIDE HYDROCHLORIDE 5 MG/ML
10 INJECTION INTRAMUSCULAR; INTRAVENOUS AS NEEDED
OUTPATIENT
Start: 2022-03-14 | End: 2022-03-15

## 2022-03-14 RX ORDER — SODIUM CHLORIDE, SODIUM LACTATE, POTASSIUM CHLORIDE, CALCIUM CHLORIDE 600; 310; 30; 20 MG/100ML; MG/100ML; MG/100ML; MG/100ML
INJECTION, SOLUTION INTRAVENOUS CONTINUOUS
OUTPATIENT
Start: 2022-03-14

## 2022-03-14 RX ORDER — SODIUM CHLORIDE, SODIUM LACTATE, POTASSIUM CHLORIDE, CALCIUM CHLORIDE 600; 310; 30; 20 MG/100ML; MG/100ML; MG/100ML; MG/100ML
INJECTION, SOLUTION INTRAVENOUS CONTINUOUS
Status: DISCONTINUED | OUTPATIENT
Start: 2022-03-14 | End: 2022-03-14

## 2022-03-14 RX ORDER — HYDROCODONE BITARTRATE AND ACETAMINOPHEN 10; 325 MG/1; MG/1
1 TABLET ORAL AS NEEDED
OUTPATIENT
Start: 2022-03-14

## 2022-03-14 RX ORDER — NALOXONE HYDROCHLORIDE 0.4 MG/ML
80 INJECTION, SOLUTION INTRAMUSCULAR; INTRAVENOUS; SUBCUTANEOUS AS NEEDED
OUTPATIENT
Start: 2022-03-14 | End: 2022-03-15

## 2022-03-14 RX ORDER — ONDANSETRON 2 MG/ML
4 INJECTION INTRAMUSCULAR; INTRAVENOUS AS NEEDED
OUTPATIENT
Start: 2022-03-14 | End: 2022-03-15

## 2022-03-14 RX ORDER — GLYCOPYRROLATE 0.2 MG/ML
0.4 INJECTION, SOLUTION INTRAMUSCULAR; INTRAVENOUS ONCE
Status: COMPLETED | OUTPATIENT
Start: 2022-03-14 | End: 2022-03-14

## 2022-03-14 RX ORDER — HYDROMORPHONE HYDROCHLORIDE 1 MG/ML
0.4 INJECTION, SOLUTION INTRAMUSCULAR; INTRAVENOUS; SUBCUTANEOUS EVERY 5 MIN PRN
OUTPATIENT
Start: 2022-03-14 | End: 2022-03-15

## 2022-03-14 RX ADMIN — SODIUM CHLORIDE, SODIUM LACTATE, POTASSIUM CHLORIDE, CALCIUM CHLORIDE: 600; 310; 30; 20 INJECTION, SOLUTION INTRAVENOUS at 16:43:00

## 2022-03-14 RX ADMIN — LIDOCAINE HYDROCHLORIDE 25 MG: 10 INJECTION, SOLUTION EPIDURAL; INFILTRATION; INTRACAUDAL; PERINEURAL at 16:22:00

## 2022-03-14 RX ADMIN — SODIUM CHLORIDE, SODIUM LACTATE, POTASSIUM CHLORIDE, CALCIUM CHLORIDE: 600; 310; 30; 20 INJECTION, SOLUTION INTRAVENOUS at 16:17:00

## 2022-03-14 NOTE — OPERATIVE REPORT
OPERATIVE REPORT   PATIENT NAME: Jorje Ly  MRN: [id number]  DATE OF OPERATION: 3/14/2022  PREOPERATIVE DIAGNOSIS:   1. Abdominal pain, elevated liver enzymes, abnormal CT scan with questionable common bile duct stones. 2. Cholelithiasis  POSTOPERATIVE DIAGNOSES:  1. Cholelithiasis. 2. Pancreatic head calcifications without obvious stones in the bile duct. PROCEDURE PERFORMED: Upper endoscopic ultrasound. SURGEON: Sully Jones MD   MEDICATIONS: Indocin 100 mg suppositories were given. ANESTHESIA: MAC  CONSENT: The procedure and risks of procedure was discussed with the patient in detail including but not limited to bleeding, perforation, medication reaction, infection and pancreatitis which can be mild to severe with prolonged hospital stay leading up to complications and even death. Alternatives and options, as well as rationale was discussed. Patient verbalizes understanding. All questions answered. SPECIMEN: None  COMPLICATIONS: None immediately apparent  PROCEDURE AND FINDINGS:   After achieving adequate sedation and placing the patient in the left lateral decubitus position the Olympus linear echoendoscope was introduced under direct visualization in the esophagus passed into the stomach and second portion of the duodenum. The pancreatic body and tail exam from the stomach. There are few scattered calcifications seen in the body of the pancreas. The head of the pancreas and the uncinate process were then examined from the second portion of the duodenum as well as duodenal bulb. There are few scattered shadowing calcifications seen in the head of the pancreas. The bile duct and the pancreatic duct were both visualized entering the duodenum at the level of the major papilla without obvious dilation. The bile duct was visualized without obvious stones or sludge. No bile duct wall thickening seen. The gallbladder contained multiple small shadowing stones.   No obvious peripancreatic lymphadenopathy seen. IMPRESSION:  1. Findings as described above with mild chronic pancreatitis changes. 2. Cholelithiasis  RECOMMENDATIONS:  1.  Order for the patient to general surgery for evaluation for cholecystectomy with intraoperative cholangiogram.  2. We will repeat liver function test.  Laurita Dakin, MD

## 2022-03-22 PROBLEM — I65.29 CAROTID ATHEROSCLEROSIS: Status: ACTIVE | Noted: 2022-03-22

## 2022-03-22 PROBLEM — I71.2 ANEURYSM, AORTA, THORACIC (HCC): Status: ACTIVE | Noted: 2022-03-22

## 2022-03-22 PROBLEM — I73.9 PERIPHERAL ARTERY DISEASE (HCC): Status: ACTIVE | Noted: 2022-03-22

## 2022-03-22 PROBLEM — I77.9 BILATERAL CAROTID ARTERY DISEASE (HCC): Chronic | Status: RESOLVED | Noted: 2017-03-03 | Resolved: 2022-03-22

## 2022-03-22 PROBLEM — D69.2 SENILE PURPURA (HCC): Status: ACTIVE | Noted: 2022-03-22

## 2022-03-22 PROBLEM — I71.20 ANEURYSM, AORTA, THORACIC (HCC): Status: ACTIVE | Noted: 2022-03-22

## 2023-12-03 ENCOUNTER — APPOINTMENT (OUTPATIENT)
Dept: GENERAL RADIOLOGY | Facility: HOSPITAL | Age: 78
End: 2023-12-03
Payer: MEDICARE

## 2023-12-03 ENCOUNTER — HOSPITAL ENCOUNTER (EMERGENCY)
Facility: HOSPITAL | Age: 78
Discharge: HOME | End: 2023-12-03
Attending: EMERGENCY MEDICINE
Payer: MEDICARE

## 2023-12-03 VITALS
TEMPERATURE: 98 F | SYSTOLIC BLOOD PRESSURE: 124 MMHG | DIASTOLIC BLOOD PRESSURE: 65 MMHG | WEIGHT: 190 LBS | HEIGHT: 67 IN | BODY MASS INDEX: 29.82 KG/M2 | HEART RATE: 50 BPM | RESPIRATION RATE: 15 BRPM | OXYGEN SATURATION: 100 %

## 2023-12-03 DIAGNOSIS — R07.89 CHEST PAIN, ATYPICAL: Primary | ICD-10-CM

## 2023-12-03 LAB
ALBUMIN SERPL-MCNC: 3.9 G/DL (ref 3.4–5)
ALBUMIN/GLOB SERPL: 1.2 {RATIO} (ref 1–2)
ALP LIVER SERPL-CCNC: 57 U/L
ALT SERPL-CCNC: 26 U/L
ANION GAP SERPL CALC-SCNC: 3 MMOL/L (ref 0–18)
AST SERPL-CCNC: 24 U/L (ref 15–37)
BASOPHILS # BLD AUTO: 0.06 X10(3) UL (ref 0–0.2)
BASOPHILS NFR BLD AUTO: 0.7 %
BILIRUB SERPL-MCNC: 0.4 MG/DL (ref 0.1–2)
BUN BLD-MCNC: 16 MG/DL (ref 9–23)
CALCIUM BLD-MCNC: 9 MG/DL (ref 8.5–10.1)
CHLORIDE SERPL-SCNC: 104 MMOL/L (ref 98–112)
CO2 SERPL-SCNC: 32 MMOL/L (ref 21–32)
CREAT BLD-MCNC: 1.31 MG/DL
EGFRCR SERPLBLD CKD-EPI 2021: 56 ML/MIN/1.73M2 (ref 60–?)
EOSINOPHIL # BLD AUTO: 0.2 X10(3) UL (ref 0–0.7)
EOSINOPHIL NFR BLD AUTO: 2.5 %
ERYTHROCYTE [DISTWIDTH] IN BLOOD BY AUTOMATED COUNT: 12.4 %
GLOBULIN PLAS-MCNC: 3.2 G/DL (ref 2.8–4.4)
GLUCOSE BLD-MCNC: 93 MG/DL (ref 70–99)
HCT VFR BLD AUTO: 41.8 %
HGB BLD-MCNC: 14.1 G/DL
IMM GRANULOCYTES # BLD AUTO: 0.02 X10(3) UL (ref 0–1)
IMM GRANULOCYTES NFR BLD: 0.2 %
LYMPHOCYTES # BLD AUTO: 3.04 X10(3) UL (ref 1–4)
LYMPHOCYTES NFR BLD AUTO: 37.7 %
MCH RBC QN AUTO: 30.2 PG (ref 26–34)
MCHC RBC AUTO-ENTMCNC: 33.7 G/DL (ref 31–37)
MCV RBC AUTO: 89.5 FL
MONOCYTES # BLD AUTO: 0.92 X10(3) UL (ref 0.1–1)
MONOCYTES NFR BLD AUTO: 11.4 %
NEUTROPHILS # BLD AUTO: 3.82 X10 (3) UL (ref 1.5–7.7)
NEUTROPHILS # BLD AUTO: 3.82 X10(3) UL (ref 1.5–7.7)
NEUTROPHILS NFR BLD AUTO: 47.5 %
OSMOLALITY SERPL CALC.SUM OF ELEC: 289 MOSM/KG (ref 275–295)
PLATELET # BLD AUTO: 191 10(3)UL (ref 150–450)
POTASSIUM SERPL-SCNC: 3.8 MMOL/L (ref 3.5–5.1)
PROT SERPL-MCNC: 7.1 G/DL (ref 6.4–8.2)
RBC # BLD AUTO: 4.67 X10(6)UL
SODIUM SERPL-SCNC: 139 MMOL/L (ref 136–145)
TROPONIN I SERPL HS-MCNC: 10 NG/L
WBC # BLD AUTO: 8.1 X10(3) UL (ref 4–11)

## 2023-12-03 PROCEDURE — 80053 COMPREHEN METABOLIC PANEL: CPT | Performed by: EMERGENCY MEDICINE

## 2023-12-03 PROCEDURE — 71045 X-RAY EXAM CHEST 1 VIEW: CPT

## 2023-12-03 PROCEDURE — 84484 ASSAY OF TROPONIN QUANT: CPT

## 2023-12-03 PROCEDURE — 93005 ELECTROCARDIOGRAM TRACING: CPT

## 2023-12-03 PROCEDURE — 93010 ELECTROCARDIOGRAM REPORT: CPT

## 2023-12-03 PROCEDURE — 85025 COMPLETE CBC W/AUTO DIFF WBC: CPT

## 2023-12-03 PROCEDURE — 99285 EMERGENCY DEPT VISIT HI MDM: CPT

## 2023-12-03 PROCEDURE — 99284 EMERGENCY DEPT VISIT MOD MDM: CPT

## 2023-12-03 PROCEDURE — 84484 ASSAY OF TROPONIN QUANT: CPT | Performed by: EMERGENCY MEDICINE

## 2023-12-03 PROCEDURE — 85025 COMPLETE CBC W/AUTO DIFF WBC: CPT | Performed by: EMERGENCY MEDICINE

## 2023-12-03 PROCEDURE — 36415 COLL VENOUS BLD VENIPUNCTURE: CPT

## 2023-12-03 PROCEDURE — 80053 COMPREHEN METABOLIC PANEL: CPT

## 2023-12-04 LAB
ATRIAL RATE: 55 BPM
P AXIS: 62 DEGREES
P-R INTERVAL: 170 MS
Q-T INTERVAL: 424 MS
QRS DURATION: 92 MS
QTC CALCULATION (BEZET): 405 MS
R AXIS: 24 DEGREES
T AXIS: 67 DEGREES
VENTRICULAR RATE: 55 BPM

## 2023-12-04 NOTE — DISCHARGE INSTRUCTIONS
Call Dr. Alison Howard office tomorrow morning to try to get a stress echocardiogram completed tomorrow. You should put off your trip until that is done and reported as negative.     Return if the character of your chest pain changes or becomes more severe

## 2023-12-04 NOTE — CM/SW NOTE
2056:  Called by Dr. Yas Mckee requesting ERCM to schedule patient stress echo tomorrow if possible. Per Dr. Yas Mckee patient is a patient of [facility]. 2110:  [facility] attempted to schedule patient for outpatient stress echo - however when this ERCM clicked on times for tomorrow 12/4 message popped up stating \"warning blocked conflict\". Also, epic stating \"no available times until 3/4/24. ERC called and informed Dr. Yas Mckee of all of the above and advised him due to patient being a [facility] patient - patient should contact [facility] to schedule ER F/U appointment with his [facility]ologist Dr. Yaneth Ballesteros and if Dr. Yaneth Ballesteros feels patient should have stress echo - Dr. Raj Monique office should schedule patient to have stress echo performed at [facility] or [facility]. informed Dr. Yas Mckee patient is appropriate to OBS due to chest pain. Per Dr. Yas Mckee patient declining OBS admit and he will inform patient to schedule ER F/U appointment ASAP with Dr. Yaneth Ballesteros at [facility] and Dr. Raj Monique office will schedule stress echo for patient if Dr. Yaneth Ballesteros feels stress echo appropriate.

## 2023-12-04 NOTE — ED INITIAL ASSESSMENT (HPI)
Pt c/o intermittent mid sternal CP since 0900 described as dull, non radiating.  He denies injury, he denies YAKELIN

## (undated) DEVICE — 1200CC GUARDIAN II: Brand: GUARDIAN

## (undated) DEVICE — ENDOSCOPY PACK - LOWER: Brand: MEDLINE INDUSTRIES, INC.

## (undated) DEVICE — 3M™ RED DOT™ MONITORING ELECTRODE WITH FOAM TAPE AND STICKY GEL, 50/BAG, 20/CASE, 72/PLT 2570: Brand: RED DOT™

## (undated) DEVICE — FILTERLINE NASAL ADULT O2/CO2

## (undated) NOTE — LETTER
[facility] [address], [address]  Consent for Procedure/Sedation    Date:     Time:       1.  I authorize the performance upon James Donahue the following:cardiac catheterization, left ventricular cineangiography, bilateral selec period, the physician will determine when the applicable recovery period ends for purposes of reinstating the Do Not Resuscitate (DNR) order.     Signature of Patient: ____________________________________________________    Signature of person authorized

## (undated) NOTE — ED AVS SNAPSHOT
Myla Knee   MRN: [id number]    Department:  [facility] Emergency Department   Date of Visit:  10/26/2018           Disclosure     Insurance plans vary and the physician(s) referred by the ER may not be covered by your plan.  Please contact you tell this physician (or your personal doctor if your instructions are to return to your personal doctor) about any new or lasting problems. The primary care or specialist physician will see patients referred from the [facility] Emergency Department.  Benjamin Veloz